# Patient Record
Sex: MALE | Race: WHITE | NOT HISPANIC OR LATINO | URBAN - METROPOLITAN AREA
[De-identification: names, ages, dates, MRNs, and addresses within clinical notes are randomized per-mention and may not be internally consistent; named-entity substitution may affect disease eponyms.]

---

## 2022-05-17 ENCOUNTER — OFFICE VISIT (OUTPATIENT)
Dept: URBAN - METROPOLITAN AREA CLINIC 68 | Facility: CLINIC | Age: 22
End: 2022-05-17

## 2022-05-27 ENCOUNTER — TELEPHONE ENCOUNTER (OUTPATIENT)
Dept: URBAN - METROPOLITAN AREA CLINIC 68 | Facility: CLINIC | Age: 22
End: 2022-05-27

## 2022-06-20 ENCOUNTER — TELEPHONE ENCOUNTER (OUTPATIENT)
Dept: URBAN - METROPOLITAN AREA CLINIC 68 | Facility: CLINIC | Age: 22
End: 2022-06-20

## 2022-06-21 ENCOUNTER — TELEPHONE ENCOUNTER (OUTPATIENT)
Dept: URBAN - METROPOLITAN AREA CLINIC 68 | Facility: CLINIC | Age: 22
End: 2022-06-21

## 2022-06-22 ENCOUNTER — OFFICE VISIT (OUTPATIENT)
Dept: URBAN - METROPOLITAN AREA CLINIC 68 | Facility: CLINIC | Age: 22
End: 2022-06-22

## 2022-06-25 ENCOUNTER — TELEPHONE ENCOUNTER (OUTPATIENT)
Age: 22
End: 2022-06-25

## 2022-06-26 ENCOUNTER — TELEPHONE ENCOUNTER (OUTPATIENT)
Age: 22
End: 2022-06-26

## 2022-06-26 RX ORDER — PREDNISONE 10 MG/1
TABLET ORAL
Qty: 60 | Refills: 60 | Status: ACTIVE | COMMUNITY
Start: 2022-06-22

## 2022-06-26 RX ORDER — INFLIXIMAB-DYYB 100 MG/10ML
INFLECTRA( 100MG INTRAVENOUS   ) ACTIVE -HX ENTRY INJECTION, POWDER, LYOPHILIZED, FOR SOLUTION INTRAVENOUS
Status: ACTIVE | COMMUNITY
Start: 2022-06-22

## 2022-06-30 ENCOUNTER — TELEPHONE ENCOUNTER (OUTPATIENT)
Dept: URBAN - METROPOLITAN AREA CLINIC 68 | Facility: CLINIC | Age: 22
End: 2022-06-30

## 2022-07-19 ENCOUNTER — OFFICE VISIT (OUTPATIENT)
Dept: URBAN - METROPOLITAN AREA CLINIC 68 | Facility: CLINIC | Age: 22
End: 2022-07-19

## 2022-07-19 RX ORDER — INFLIXIMAB-DYYB 100 MG/10ML
AS DIRECTED INJECTION, POWDER, LYOPHILIZED, FOR SOLUTION INTRAVENOUS
OUTPATIENT
Start: 2022-07-19

## 2022-07-19 RX ORDER — PREDNISONE 10 MG/1
2 TABLETS TABLET ORAL
Refills: 0

## 2022-07-19 RX ORDER — INFLIXIMAB-DYYB 100 MG/10ML
AS DIRECTED INJECTION, POWDER, LYOPHILIZED, FOR SOLUTION INTRAVENOUS
Status: ACTIVE | COMMUNITY
Start: 2022-07-19

## 2022-07-19 RX ORDER — PREDNISONE 10 MG/1
TAKE 2 TABLETS BY MOUTH EVERY DAY OR AS DIRECTED TABLET ORAL
Qty: 60 EACH | Refills: 0 | Status: ACTIVE | COMMUNITY

## 2022-07-19 NOTE — HPI-MIGRATED HPI
General : Known hIstory of UC on INflextra , felt symptoms of diarrha and pain at week 7  , weakness all resolved on prednisone and regular dose of inflectra q 8 weeks  Now all resooved on pred 20 mg daily

## 2022-07-19 NOTE — EXAM-MIGRATED EXAMINATIONS
General Examination: Head: -> normocephalic, atraumatic   Eyes: -> pupils equal, round, reactive to light and accommodation   Ears: -> normal   Nose: -> no lesions   Oral cavity: -> normal , mucosa moist , tongue is midline , with good dentition   Chest: -> chest wall with no costochondral junction tenderness, no rib deformity and normal shape and expansion   Abdomen: -> soft with good bowel sounds, nontender, and no masses or hepatosplenomegaly   Rectal: -> not examined   Musculoskeletal: -> no swelling, redness, warmth or tenderness of the shoulder(s) with full range of motion   Extremities: -> normal extremity with no clubbing, cyanosis or edema   Neurologic: -> nonfocal , alert and oriented   Throat: -> clear   Neck / thyroid: -> neck is supple, with full range of motion and no cervical lymphadenopathy , no masses and/or tenderness , no jugular venous distention , trachea midline   Lymph nodes: -> no axillary, supraclavicular or inguinal lymphadenopathy   Skin: -> skin is warm and dry, with no rashes, good skin turgor and normal hair distribution   Heart: -> regular rate and rhythm without murmurs, gallops, clicks or rubs   Lungs: -> clear to auscultation bilaterally, with good air movement and no rales, rhonchi or wheezes   General appearance: -> alert, pleasant, well-nourished and in no acute distress

## 2022-08-16 ENCOUNTER — TELEPHONE ENCOUNTER (OUTPATIENT)
Dept: URBAN - METROPOLITAN AREA CLINIC 68 | Facility: CLINIC | Age: 22
End: 2022-08-16

## 2022-08-16 RX ORDER — INFLIXIMAB-DYYB 100 MG/10ML
AS DIRECTED INJECTION, POWDER, LYOPHILIZED, FOR SOLUTION INTRAVENOUS
Start: 2022-07-19

## 2022-08-30 ENCOUNTER — OFFICE VISIT (OUTPATIENT)
Dept: URBAN - METROPOLITAN AREA CLINIC 68 | Facility: CLINIC | Age: 22
End: 2022-08-30

## 2022-08-30 RX ORDER — INFLIXIMAB-DYYB 100 MG/10ML
AS DIRECTED INJECTION, POWDER, LYOPHILIZED, FOR SOLUTION INTRAVENOUS
Status: ACTIVE | COMMUNITY
Start: 2022-07-19

## 2022-08-30 RX ORDER — PREDNISONE 10 MG/1
2 TABLETS TABLET ORAL ONCE A DAY
Qty: 60 TABLET | Refills: 1

## 2022-08-30 RX ORDER — INFLIXIMAB-DYYB 100 MG/10ML
AS DIRECTED INJECTION, POWDER, LYOPHILIZED, FOR SOLUTION INTRAVENOUS
Qty: 2 EACH | Refills: 3

## 2022-08-30 RX ORDER — PREDNISONE 10 MG/1
2 TABLETS TABLET ORAL
Refills: 0 | Status: ACTIVE | COMMUNITY

## 2022-08-30 RX ORDER — VEDOLIZUMAB 300 MG/5ML
AS DIRECTED INJECTION, POWDER, LYOPHILIZED, FOR SOLUTION INTRAVENOUS
OUTPATIENT
Start: 2022-08-31

## 2022-08-30 NOTE — EXAM-MIGRATED EXAMINATIONS
General Examination: General appearance: -> alert, pleasant, well-nourished and in no acute distress , alert, pleasant, well-nourished and in no acute distress   Head: -> normocephalic, atraumatic , normocephalic, atraumatic   Eyes: -> pupils equal, round, reactive to light and accommodation , pupils equal, round, reactive to light and accommodation   Ears: -> normal , normal   Nose: -> no lesions , no lesions   Oral cavity: -> normal , mucosa moist , tongue is midline , with good dentition , normal , mucosa moist , tongue is midline , with good dentition   Chest: -> chest wall with no costochondral junction tenderness, no rib deformity and normal shape and expansion , chest wall with no costochondral junction tenderness, no rib deformity and normal shape and expansion   Abdomen: -> soft with good bowel sounds, nontender, and no masses or hepatosplenomegaly , soft with good bowel sounds, nontender, and no masses or hepatosplenomegaly   Rectal: -> not examined , not examined   Musculoskeletal: -> no swelling, redness, warmth or tenderness of the shoulder(s) with full range of motion , no swelling, redness, warmth or tenderness of the shoulder(s) with full range of motion   Extremities: -> normal extremity with no clubbing, cyanosis or edema , normal extremity with no clubbing, cyanosis or edema   Neurologic: -> nonfocal , alert and oriented , nonfocal , alert and oriented   Throat: -> clear , clear   Neck / thyroid: -> neck is supple, with full range of motion and no cervical lymphadenopathy , no masses and/or tenderness , no jugular venous distention , trachea midline , neck is supple, with full range of motion and no cervical lymphadenopathy , no masses and/or tenderness , no jugular venous distention , trachea midline   Lymph nodes: -> no axillary, supraclavicular or inguinal lymphadenopathy , no axillary, supraclavicular or inguinal lymphadenopathy   Skin: -> skin is warm and dry, with no rashes, good skin turgor and normal hair distribution , skin is warm and dry, with no rashes, good skin turgor and normal hair distribution   Heart: -> regular rate and rhythm without murmurs, gallops, clicks or rubs , regular rate and rhythm without murmurs, gallops, clicks or rubs   Lungs: -> clear to auscultation bilaterally, with good air movement and no rales, rhonchi or wheezes , clear to auscultation bilaterally, with good air movement and no rales, rhonchi or wheezes

## 2022-08-30 NOTE — HPI-MIGRATED HPI
General : Persistent diarrhea, requires 20 mg pred daily for control , otherwise on 10 mg  frequent BM's Inflectra dose is 5 mgkg, last dose 2 weeks ago, low ab level, low level of medication level at week 8 troughs  prior was on Remicade for yrs and symptoms controlled until after move here recently History of resection for Crohns

## 2022-08-31 ENCOUNTER — TELEPHONE ENCOUNTER (OUTPATIENT)
Dept: URBAN - METROPOLITAN AREA CLINIC 68 | Facility: CLINIC | Age: 22
End: 2022-08-31

## 2022-08-31 RX ORDER — VEDOLIZUMAB 300 MG/5ML
AS DIRECTED INJECTION, POWDER, LYOPHILIZED, FOR SOLUTION INTRAVENOUS
Start: 2022-08-31

## 2022-09-01 ENCOUNTER — TELEPHONE ENCOUNTER (OUTPATIENT)
Dept: URBAN - METROPOLITAN AREA CLINIC 68 | Facility: CLINIC | Age: 22
End: 2022-09-01

## 2022-09-01 RX ORDER — VEDOLIZUMAB 300 MG/5ML
AS DIRECTED INJECTION, POWDER, LYOPHILIZED, FOR SOLUTION INTRAVENOUS
Start: 2022-08-31

## 2022-09-01 RX ORDER — INFLIXIMAB-DYYB 100 MG/10ML
AS DIRECTED INJECTION, POWDER, LYOPHILIZED, FOR SOLUTION INTRAVENOUS
OUTPATIENT

## 2022-09-19 ENCOUNTER — TELEPHONE ENCOUNTER (OUTPATIENT)
Dept: URBAN - METROPOLITAN AREA CLINIC 68 | Facility: CLINIC | Age: 22
End: 2022-09-19

## 2022-09-19 RX ORDER — VEDOLIZUMAB 300 MG/5ML
AS DIRECTED INJECTION, POWDER, LYOPHILIZED, FOR SOLUTION INTRAVENOUS
OUTPATIENT
Start: 2022-09-19

## 2022-09-19 RX ORDER — VEDOLIZUMAB 300 MG/5ML
AS DIRECTED INJECTION, POWDER, LYOPHILIZED, FOR SOLUTION INTRAVENOUS
COMMUNITY
Start: 2022-08-31

## 2022-09-19 RX ORDER — PREDNISONE 10 MG/1
2 TABLETS TABLET ORAL ONCE A DAY
Qty: 60 TABLET | Refills: 1 | COMMUNITY

## 2022-09-19 NOTE — HPI-MIGRATED HPI
General : Still with loose stool and cannot stop prednisone therapy , still on Inflextra therapy , no antibody levels noted,

## 2022-09-30 ENCOUNTER — OFFICE VISIT (OUTPATIENT)
Dept: URBAN - METROPOLITAN AREA SURGERY CENTER 12 | Facility: SURGERY CENTER | Age: 22
End: 2022-09-30

## 2022-11-11 ENCOUNTER — TELEPHONE ENCOUNTER (OUTPATIENT)
Dept: URBAN - METROPOLITAN AREA CLINIC 68 | Facility: CLINIC | Age: 22
End: 2022-11-11

## 2023-01-25 ENCOUNTER — OFFICE VISIT (OUTPATIENT)
Dept: URBAN - METROPOLITAN AREA CLINIC 68 | Facility: CLINIC | Age: 23
End: 2023-01-25

## 2023-01-25 RX ORDER — PREDNISONE 10 MG/1
2 TABLETS TABLET ORAL ONCE A DAY
Qty: 60 TABLET | Refills: 1 | Status: DISCONTINUED | COMMUNITY

## 2023-01-25 RX ORDER — VEDOLIZUMAB 300 MG/5ML
AS DIRECTED INJECTION, POWDER, LYOPHILIZED, FOR SOLUTION INTRAVENOUS
Status: ACTIVE | COMMUNITY
Start: 2022-09-19

## 2023-01-25 RX ORDER — VEDOLIZUMAB 300 MG/5ML
AS DIRECTED INJECTION, POWDER, LYOPHILIZED, FOR SOLUTION INTRAVENOUS
OUTPATIENT
Start: 2022-09-19

## 2023-01-25 RX ORDER — VEDOLIZUMAB 300 MG/5ML
AS DIRECTED INJECTION, POWDER, LYOPHILIZED, FOR SOLUTION INTRAVENOUS
COMMUNITY
Start: 2022-08-31

## 2023-01-25 NOTE — HPI-MIGRATED HPI
General : 1/25  off  prednisone  Entyvio home infusion every 8 weeks  No bleeding diarrhea and pain , all resolved  ON BM daily appetitie and weight is good  PREVIOUSLY  Still with loose stool and cannot stop prednisone therapy , still on Inflextra therapy , no antibody levels noted,

## 2023-02-20 ENCOUNTER — LAB OUTSIDE AN ENCOUNTER (OUTPATIENT)
Dept: URBAN - METROPOLITAN AREA CLINIC 68 | Facility: CLINIC | Age: 23
End: 2023-02-20

## 2023-02-21 LAB
A/G RATIO: 1.5
ALBUMIN: 4.6
ALKALINE PHOSPHATASE: 87
ALT (SGPT): 22
AMBIG ABBREV CMP14 DEFAULT: (no result)
AST (SGOT): 22
BASO (ABSOLUTE): 0.1
BASOS: 1
BILIRUBIN, TOTAL: 0.6
BUN/CREATININE RATIO: 15
BUN: 13
CALCIUM: 9.9
CARBON DIOXIDE, TOTAL: 26
CHLORIDE: 100
CREATININE: 0.88
EGFR: 123
EOS (ABSOLUTE): 0.3
EOS: 5
GLOBULIN, TOTAL: 3.1
GLUCOSE: 81
HEMATOCRIT: 42.4
HEMATOLOGY COMMENTS:: (no result)
HEMOGLOBIN: 12.2
IMMATURE CELLS: (no result)
IMMATURE GRANS (ABS): 0
IMMATURE GRANULOCYTES: 0
LYMPHS (ABSOLUTE): 2.1
LYMPHS: 29
MCH: 20
MCHC: 28.8
MCV: 69
MONOCYTES(ABSOLUTE): 0.7
MONOCYTES: 10
NEUTROPHILS (ABSOLUTE): 4
NEUTROPHILS: 55
NRBC: (no result)
PLATELETS: 470
POTASSIUM: 5.4
PROTEIN, TOTAL: 7.7
RBC: 6.11
RDW: 18.4
SODIUM: 140
WBC: 7.3

## 2023-03-02 ENCOUNTER — TELEPHONE ENCOUNTER (OUTPATIENT)
Dept: URBAN - METROPOLITAN AREA CLINIC 68 | Facility: CLINIC | Age: 23
End: 2023-03-02

## 2023-03-27 ENCOUNTER — OFFICE VISIT (OUTPATIENT)
Dept: URBAN - METROPOLITAN AREA CLINIC 68 | Facility: CLINIC | Age: 23
End: 2023-03-27

## 2023-07-10 ENCOUNTER — DASHBOARD ENCOUNTERS (OUTPATIENT)
Age: 23
End: 2023-07-10

## 2023-07-10 ENCOUNTER — LAB OUTSIDE AN ENCOUNTER (OUTPATIENT)
Dept: URBAN - METROPOLITAN AREA CLINIC 68 | Facility: CLINIC | Age: 23
End: 2023-07-10

## 2023-07-10 ENCOUNTER — OFFICE VISIT (OUTPATIENT)
Dept: URBAN - METROPOLITAN AREA CLINIC 68 | Facility: CLINIC | Age: 23
End: 2023-07-10
Payer: COMMERCIAL

## 2023-07-10 ENCOUNTER — WEB ENCOUNTER (OUTPATIENT)
Dept: URBAN - METROPOLITAN AREA CLINIC 68 | Facility: CLINIC | Age: 23
End: 2023-07-10

## 2023-07-10 VITALS
HEIGHT: 70 IN | WEIGHT: 175 LBS | SYSTOLIC BLOOD PRESSURE: 120 MMHG | BODY MASS INDEX: 25.05 KG/M2 | DIASTOLIC BLOOD PRESSURE: 78 MMHG

## 2023-07-10 DIAGNOSIS — K50.90 CROHN DISEASE: ICD-10-CM

## 2023-07-10 DIAGNOSIS — D64.9 ANEMIA: ICD-10-CM

## 2023-07-10 PROCEDURE — 99214 OFFICE O/P EST MOD 30 MIN: CPT | Performed by: SPECIALIST

## 2023-07-10 RX ORDER — VEDOLIZUMAB 300 MG/5ML
AS DIRECTED INJECTION, POWDER, LYOPHILIZED, FOR SOLUTION INTRAVENOUS
Status: ACTIVE | COMMUNITY
Start: 2022-09-19

## 2023-07-10 RX ORDER — VEDOLIZUMAB 300 MG/5ML
AS DIRECTED INJECTION, POWDER, LYOPHILIZED, FOR SOLUTION INTRAVENOUS
OUTPATIENT

## 2023-07-10 RX ORDER — SOD SULF/POT CHLORIDE/MAG SULF 1.479 G
AS DIRECTED TABLET ORAL
OUTPATIENT
Start: 2023-07-10

## 2023-07-10 NOTE — HPI-MIGRATED HPI
7/10  stable no sign of flare , getting infusions  General : 1/25  off  prednisone  Entyvio home infusion every 8 weeks  No bleeding diarrhea and pain , all resolved  ON BM daily appetitie and weight is good  PREVIOUSLY  Still with loose stool and cannot stop prednisone therapy , still on Inflextra therapy , no antibody levels noted, General : 1/25  off  prednisone  Entyvio home infusion every 8 weeks  No bleeding diarrhea and pain , all resolved  ON BM daily appetitie and weight is good  PREVIOUSLY  Still with loose stool and cannot stop prednisone therapy , still on Inflextra therapy , no antibody levels noted,

## 2023-07-10 NOTE — PHYSICAL EXAM HENT:
Head, normocephalic, atraumatic, Face, Face within normal limits, Ears, External ears within normal limits, Nose/Nasopharynx, External nose normal appearance, nares patent, no nasal discharge, Mouth and Throat, Oral cavity appearance normal, Lips, Appearance normal
Detail Level: Detailed
Detail Level: Zone

## 2023-08-30 ENCOUNTER — TELEPHONE ENCOUNTER (OUTPATIENT)
Dept: URBAN - METROPOLITAN AREA CLINIC 68 | Facility: CLINIC | Age: 23
End: 2023-08-30

## 2023-09-05 ENCOUNTER — OUT OF OFFICE VISIT (OUTPATIENT)
Dept: URBAN - METROPOLITAN AREA SURGERY CENTER 12 | Facility: SURGERY CENTER | Age: 23
End: 2023-09-05
Payer: COMMERCIAL

## 2023-09-05 ENCOUNTER — CLAIMS CREATED FROM THE CLAIM WINDOW (OUTPATIENT)
Dept: URBAN - METROPOLITAN AREA CLINIC 4 | Facility: CLINIC | Age: 23
End: 2023-09-05
Payer: COMMERCIAL

## 2023-09-05 DIAGNOSIS — K50.90 CROHN DISEASE: ICD-10-CM

## 2023-09-05 DIAGNOSIS — K63.89 OTHER SPECIFIED DISEASES OF INTESTINE: ICD-10-CM

## 2023-09-05 DIAGNOSIS — Z87.19 PERSONAL HISTORY OF OTHER DISEASES OF THE DIGESTIVE SYSTEM: ICD-10-CM

## 2023-09-05 DIAGNOSIS — K63.3 ULCER OF INTESTINE: ICD-10-CM

## 2023-09-05 PROCEDURE — 88305 TISSUE EXAM BY PATHOLOGIST: CPT | Performed by: PATHOLOGY

## 2023-09-05 PROCEDURE — 45380 COLONOSCOPY AND BIOPSY: CPT | Performed by: SPECIALIST

## 2023-09-05 PROCEDURE — 88341 IMHCHEM/IMCYTCHM EA ADD ANTB: CPT | Performed by: PATHOLOGY

## 2023-09-05 PROCEDURE — 88342 IMHCHEM/IMCYTCHM 1ST ANTB: CPT | Performed by: PATHOLOGY

## 2023-09-05 PROCEDURE — 00811 ANES LWR INTST NDSC NOS: CPT | Performed by: NURSE ANESTHETIST, CERTIFIED REGISTERED

## 2023-09-12 ENCOUNTER — OFFICE VISIT (OUTPATIENT)
Dept: URBAN - METROPOLITAN AREA SURGERY CENTER 12 | Facility: SURGERY CENTER | Age: 23
End: 2023-09-12

## 2023-09-19 ENCOUNTER — OFFICE VISIT (OUTPATIENT)
Dept: URBAN - METROPOLITAN AREA SURGERY CENTER 12 | Facility: SURGERY CENTER | Age: 23
End: 2023-09-19

## 2023-09-26 ENCOUNTER — OFFICE VISIT (OUTPATIENT)
Dept: URBAN - METROPOLITAN AREA SURGERY CENTER 12 | Facility: SURGERY CENTER | Age: 23
End: 2023-09-26

## 2023-10-03 ENCOUNTER — OFFICE VISIT (OUTPATIENT)
Dept: URBAN - METROPOLITAN AREA SURGERY CENTER 12 | Facility: SURGERY CENTER | Age: 23
End: 2023-10-03

## 2023-10-10 ENCOUNTER — OFFICE VISIT (OUTPATIENT)
Dept: URBAN - METROPOLITAN AREA SURGERY CENTER 12 | Facility: SURGERY CENTER | Age: 23
End: 2023-10-10

## 2023-10-17 ENCOUNTER — OFFICE VISIT (OUTPATIENT)
Dept: URBAN - METROPOLITAN AREA SURGERY CENTER 12 | Facility: SURGERY CENTER | Age: 23
End: 2023-10-17

## 2023-10-24 ENCOUNTER — OFFICE VISIT (OUTPATIENT)
Dept: URBAN - METROPOLITAN AREA SURGERY CENTER 12 | Facility: SURGERY CENTER | Age: 23
End: 2023-10-24

## 2023-10-31 ENCOUNTER — OFFICE VISIT (OUTPATIENT)
Dept: URBAN - METROPOLITAN AREA SURGERY CENTER 12 | Facility: SURGERY CENTER | Age: 23
End: 2023-10-31

## 2023-11-07 ENCOUNTER — OFFICE VISIT (OUTPATIENT)
Dept: URBAN - METROPOLITAN AREA SURGERY CENTER 12 | Facility: SURGERY CENTER | Age: 23
End: 2023-11-07

## 2024-08-08 ENCOUNTER — TELEPHONE ENCOUNTER (OUTPATIENT)
Dept: URBAN - METROPOLITAN AREA CLINIC 63 | Facility: CLINIC | Age: 24
End: 2024-08-08

## 2024-08-08 ENCOUNTER — TELEPHONE ENCOUNTER (OUTPATIENT)
Dept: URBAN - METROPOLITAN AREA CLINIC 68 | Facility: CLINIC | Age: 24
End: 2024-08-08

## 2024-08-09 ENCOUNTER — TELEPHONE ENCOUNTER (OUTPATIENT)
Dept: URBAN - METROPOLITAN AREA CLINIC 68 | Facility: CLINIC | Age: 24
End: 2024-08-09

## 2024-08-09 ENCOUNTER — OFFICE VISIT (OUTPATIENT)
Dept: URBAN - METROPOLITAN AREA CLINIC 68 | Facility: CLINIC | Age: 24
End: 2024-08-09
Payer: COMMERCIAL

## 2024-08-09 ENCOUNTER — LAB OUTSIDE AN ENCOUNTER (OUTPATIENT)
Dept: URBAN - METROPOLITAN AREA CLINIC 68 | Facility: CLINIC | Age: 24
End: 2024-08-09

## 2024-08-09 VITALS
SYSTOLIC BLOOD PRESSURE: 122 MMHG | BODY MASS INDEX: 25.74 KG/M2 | DIASTOLIC BLOOD PRESSURE: 78 MMHG | HEART RATE: 83 BPM | WEIGHT: 179.8 LBS | OXYGEN SATURATION: 98 % | HEIGHT: 70 IN

## 2024-08-09 DIAGNOSIS — R10.13 DYSPEPSIA: ICD-10-CM

## 2024-08-09 DIAGNOSIS — K50.90 CROHN DISEASE: ICD-10-CM

## 2024-08-09 PROBLEM — 162031009: Status: ACTIVE | Noted: 2024-08-09

## 2024-08-09 PROCEDURE — 99214 OFFICE O/P EST MOD 30 MIN: CPT

## 2024-08-09 RX ORDER — VEDOLIZUMAB 300 MG/5ML
AS DIRECTED INJECTION, POWDER, LYOPHILIZED, FOR SOLUTION INTRAVENOUS
Status: ACTIVE | COMMUNITY

## 2024-08-09 NOTE — HPI-TODAY'S VISIT:
Patient with Crohn's Disease presents for routine visit. He is feeling well and describes having regular daily BMs. He is s/p a colonoscopy 9/5/23 found with patent ileo-colonic end-to-end anastomosis, otherwise normal colon, bx negative, CD in remission. He continues on Entyvio infusions every 8 weeks. He was previously on Remicaide which became less effective and ultimately required resection of the small bowel in 2020 due to complications from CD. He continues to be in remission with Entyvio. He does describe 2 isolated episodes of upper abdominal burning pain with associated nausea within the last 4 months. Last episode was last week. He is recommended to follow-up in 2 weeks and if symptoms persist may need EGD and abdominal US. Denies vomiting, dysphagia, odynophagia, abdominal pain, melena, rectal bleeding, diarrhea, constipation, weight loss, fever.

## 2024-08-12 ENCOUNTER — WEB ENCOUNTER (OUTPATIENT)
Dept: URBAN - METROPOLITAN AREA CLINIC 68 | Facility: CLINIC | Age: 24
End: 2024-08-12

## 2024-08-13 ENCOUNTER — TELEPHONE ENCOUNTER (OUTPATIENT)
Dept: URBAN - METROPOLITAN AREA CLINIC 68 | Facility: CLINIC | Age: 24
End: 2024-08-13

## 2024-08-13 LAB
ABSOLUTE BASOPHILS: 62
ABSOLUTE EOSINOPHILS: 240
ABSOLUTE LYMPHOCYTES: 1718
ABSOLUTE MONOCYTES: 712
ABSOLUTE NEUTROPHILS: 6168
ALBUMIN/GLOBULIN RATIO: 1.1
ALBUMIN: 4.2
ALKALINE PHOSPHATASE: 86
ALT: 19
AST: 13
BASOPHILS: 0.7
BILIRUBIN, TOTAL: 0.6
BUN/CREATININE RATIO: (no result)
CALCIUM: 9.7
CARBON DIOXIDE: 31
CHLORIDE: 99
CREATININE: 0.89
EOSINOPHILS: 2.7
GLOBULIN: 4
GLUCOSE: 90
HEMATOCRIT: 44
HEMOGLOBIN: 13.7
HEPATITIS B SURFACE ANTIGEN: (no result)
LYMPHOCYTES: 19.3
MCH: 24.5
MCHC: 31.1
MCV: 78.6
MITOGEN-NIL: 2.03
MONOCYTES: 8
MPV: 10.3
NEUTROPHILS: 69.3
PLATELET COUNT: 429
POTASSIUM: 4.7
PROTEIN, TOTAL: 8.2
QUANTIFERON NIL VALUE: 0.08
QUANTIFERON TB1 AG VALUE: 0
QUANTIFERON TB2 AG VALUE: <0
QUANTIFERON-TB GOLD PLUS: NEGATIVE
RDW: 15.7
RED BLOOD CELL COUNT: 5.6
SODIUM: 136
UREA NITROGEN (BUN): 10
WHITE BLOOD CELL COUNT: 8.9

## 2024-08-14 ENCOUNTER — TELEPHONE ENCOUNTER (OUTPATIENT)
Dept: URBAN - METROPOLITAN AREA CLINIC 68 | Facility: CLINIC | Age: 24
End: 2024-08-14

## 2024-08-14 RX ORDER — VEDOLIZUMAB 300 MG/5ML
AS DIRECTED INJECTION, POWDER, LYOPHILIZED, FOR SOLUTION INTRAVENOUS
Qty: 1 | Refills: 5

## 2024-08-20 ENCOUNTER — WEB ENCOUNTER (OUTPATIENT)
Dept: URBAN - METROPOLITAN AREA CLINIC 68 | Facility: CLINIC | Age: 24
End: 2024-08-20

## 2024-08-20 ENCOUNTER — TELEPHONE ENCOUNTER (OUTPATIENT)
Dept: URBAN - METROPOLITAN AREA CLINIC 68 | Facility: CLINIC | Age: 24
End: 2024-08-20

## 2024-08-21 ENCOUNTER — TELEPHONE ENCOUNTER (OUTPATIENT)
Dept: URBAN - METROPOLITAN AREA CLINIC 68 | Facility: CLINIC | Age: 24
End: 2024-08-21

## 2025-02-18 ENCOUNTER — TELEPHONE ENCOUNTER (OUTPATIENT)
Dept: URBAN - METROPOLITAN AREA CLINIC 68 | Facility: CLINIC | Age: 25
End: 2025-02-18

## 2025-02-24 ENCOUNTER — LAB OUTSIDE AN ENCOUNTER (OUTPATIENT)
Dept: URBAN - METROPOLITAN AREA CLINIC 68 | Facility: CLINIC | Age: 25
End: 2025-02-24

## 2025-02-25 LAB
A/G RATIO: 1
ABSOLUTE BASOPHILS: 73
ABSOLUTE EOSINOPHILS: 778
ABSOLUTE LYMPHOCYTES: 1758
ABSOLUTE MONOCYTES: 575
ABSOLUTE NEUTROPHILS: 4917
ALBUMIN: 3.8
ALKALINE PHOSPHATASE: 91
ALT (SGPT): 37
AST (SGOT): 21
BASOPHILS: 0.9
BILIRUBIN, TOTAL: 0.6
BUN/CREATININE RATIO: (no result)
BUN: 9
C-REACTIVE PROTEIN, QUANT: 3
CALCIUM: 9.4
CARBON DIOXIDE, TOTAL: 30
CHLORIDE: 103
CREATININE: 0.79
EGFR: 126
EOSINOPHILS: 9.6
GLOBULIN, TOTAL: 3.7
GLUCOSE: 89
HEMATOCRIT: 43.2
HEMOGLOBIN: 13.4
LYMPHOCYTES: 21.7
MCH: 24.4
MCHC: 31
MCV: 78.5
MONOCYTES: 7.1
MPV: 9.7
NEUTROPHILS: 60.7
PLATELET COUNT: 435
POTASSIUM: 4.1
PROTEIN, TOTAL: 7.5
RDW: 16.8
RED BLOOD CELL COUNT: 5.5
SED RATE BY MODIFIED: 14
SODIUM: 142
WHITE BLOOD CELL COUNT: 8.1

## 2025-02-26 ENCOUNTER — TELEPHONE ENCOUNTER (OUTPATIENT)
Dept: URBAN - METROPOLITAN AREA CLINIC 68 | Facility: CLINIC | Age: 25
End: 2025-02-26

## 2025-02-26 ENCOUNTER — OFFICE VISIT (OUTPATIENT)
Dept: URBAN - METROPOLITAN AREA CLINIC 68 | Facility: CLINIC | Age: 25
End: 2025-02-26

## 2025-02-26 VITALS
DIASTOLIC BLOOD PRESSURE: 88 MMHG | BODY MASS INDEX: 23.62 KG/M2 | HEIGHT: 70 IN | SYSTOLIC BLOOD PRESSURE: 130 MMHG | WEIGHT: 165 LBS

## 2025-02-26 RX ORDER — VEDOLIZUMAB 300 MG/5ML
AS DIRECTED INJECTION, POWDER, LYOPHILIZED, FOR SOLUTION INTRAVENOUS
Qty: 1 | Refills: 5 | Status: ACTIVE | COMMUNITY

## 2025-02-26 NOTE — HPI-TODAY'S VISIT:
2/26/25 Felt flare up with fever and bloat and improved after treatment  happended  3 times in week 6--8 cramps pain , and fever and sweats, bloating Improves after Entyvio treatment within a week    IMP Crohns flare, Entyio effective however no lasting 8 weeks efficacay  , will needs shorter duration Entyvio , 300 mg q 6  need a level and antibodies week 8 , and inc to q 6 if no significiant antibodies , would have to change to stellara ? otherwise  SP labs reviewed all good hbg and [facility] wnl   Pt says he has approval for entyvio fo one year   HOme nurse contact   .   Entyio q 6w [facility]   fu as neede      PRIOR Patient with Crohn's Disease presents for routine visit. He is feeling well and describes having regular daily BMs. He is s/p a colonoscopy 9/5/23 found with patent ileo-colonic end-to-end anastomosis, otherwise normal colon, bx negative, CD in remission. He continues on Entyvio infusions every 8 weeks. He was previously on Remicaide which became less effective and ultimately required resection of the small bowel in 2020 due to complications from CD. He continues to be in remission with Entyvio. He does describe 2 isolated episodes of upper abdominal burning pain with associated nausea within the last 4 months. Last episode was last week. He is recommended to follow-up in 2 weeks and if symptoms persist may need EGD and abdominal US. Denies vomiting, dysphagia, odynophagia, abdominal pain, melena, rectal bleeding, diarrhea, constipation, weight loss, fever.

## 2025-05-21 ENCOUNTER — OFFICE VISIT (OUTPATIENT)
Dept: URBAN - METROPOLITAN AREA CLINIC 68 | Facility: CLINIC | Age: 25
End: 2025-05-21

## 2025-05-27 ENCOUNTER — OFFICE VISIT (OUTPATIENT)
Dept: URBAN - METROPOLITAN AREA CLINIC 68 | Facility: CLINIC | Age: 25
End: 2025-05-27

## 2025-05-27 ENCOUNTER — TELEPHONE ENCOUNTER (OUTPATIENT)
Dept: URBAN - METROPOLITAN AREA CLINIC 68 | Facility: CLINIC | Age: 25
End: 2025-05-27

## 2025-05-27 RX ORDER — VEDOLIZUMAB 300 MG/5ML
AS DIRECTED INJECTION, POWDER, LYOPHILIZED, FOR SOLUTION INTRAVENOUS
Qty: 1 | Refills: 5 | Status: ACTIVE | COMMUNITY

## 2025-05-27 RX ORDER — VEDOLIZUMAB 300 MG/5ML
AS DIRECTED INJECTION, POWDER, LYOPHILIZED, FOR SOLUTION INTRAVENOUS
OUTPATIENT

## 2025-06-13 ENCOUNTER — TELEPHONE ENCOUNTER (OUTPATIENT)
Dept: URBAN - METROPOLITAN AREA CLINIC 68 | Facility: CLINIC | Age: 25
End: 2025-06-13

## 2025-08-25 ENCOUNTER — LAB OUTSIDE AN ENCOUNTER (OUTPATIENT)
Dept: URBAN - METROPOLITAN AREA CLINIC 68 | Facility: CLINIC | Age: 25
End: 2025-08-25

## 2025-08-26 LAB
ABSOLUTE BASOPHILS: 86
ABSOLUTE EOSINOPHILS: 312
ABSOLUTE LYMPHOCYTES: 1825
ABSOLUTE MONOCYTES: 530
ABSOLUTE NEUTROPHILS: 5047
ALBUMIN/GLOBULIN RATIO: 0.9
ALBUMIN: 3.4
ALKALINE PHOSPHATASE: 75
ALT: 12
AST: 13
BASOPHILS: 1.1
BILIRUBIN, TOTAL: 0.6
BUN/CREATININE RATIO: (no result)
CALCIUM: 9
CARBON DIOXIDE: 32
CHLORIDE: 99
CREATININE: 0.82
EOSINOPHILS: 4
GLOBULIN: 3.8
GLUCOSE: 98
HEMATOCRIT: 41.9
HEMOGLOBIN: 12.7
LYMPHOCYTES: 23.4
MCH: 24.1
MCHC: 30.3
MCV: 79.7
MONOCYTES: 6.8
MPV: 9.5
NEUTROPHILS: 64.7
PLATELET COUNT: 461
POTASSIUM: 4.7
PROTEIN, TOTAL: 7.2
RDW: 15.6
RED BLOOD CELL COUNT: 5.26
SODIUM: 138
UREA NITROGEN (BUN): 9
WHITE BLOOD CELL COUNT: 7.8